# Patient Record
Sex: FEMALE | Race: ASIAN | Employment: OTHER | URBAN - METROPOLITAN AREA
[De-identification: names, ages, dates, MRNs, and addresses within clinical notes are randomized per-mention and may not be internally consistent; named-entity substitution may affect disease eponyms.]

---

## 2018-05-08 ENCOUNTER — HOSPITAL ENCOUNTER (EMERGENCY)
Facility: CLINIC | Age: 68
Discharge: HOME | End: 2018-05-08
Attending: EMERGENCY MEDICINE | Admitting: EMERGENCY MEDICINE
Payer: MEDICARE

## 2018-05-08 VITALS
DIASTOLIC BLOOD PRESSURE: 60 MMHG | HEIGHT: 60 IN | OXYGEN SATURATION: 97 % | WEIGHT: 118 LBS | HEART RATE: 45 BPM | TEMPERATURE: 97.4 F | RESPIRATION RATE: 32 BRPM | BODY MASS INDEX: 23.16 KG/M2 | SYSTOLIC BLOOD PRESSURE: 146 MMHG

## 2018-05-08 DIAGNOSIS — N30.01 ACUTE CYSTITIS WITH HEMATURIA: ICD-10-CM

## 2018-05-08 LAB
ALBUMIN SERPL-MCNC: 3.9 G/DL (ref 3.4–5)
ALBUMIN UR-MCNC: NEGATIVE MG/DL
ALP SERPL-CCNC: 114 U/L (ref 40–150)
ALT SERPL W P-5'-P-CCNC: 32 U/L (ref 0–50)
ANION GAP SERPL CALCULATED.3IONS-SCNC: 6 MMOL/L (ref 3–14)
APPEARANCE UR: CLEAR
AST SERPL W P-5'-P-CCNC: 22 U/L (ref 0–45)
BACTERIA #/AREA URNS HPF: ABNORMAL /HPF
BASOPHILS # BLD AUTO: 0 10E9/L (ref 0–0.2)
BASOPHILS NFR BLD AUTO: 0.1 %
BILIRUB SERPL-MCNC: 0.8 MG/DL (ref 0.2–1.3)
BILIRUB UR QL STRIP: NEGATIVE
BUN SERPL-MCNC: 12 MG/DL (ref 7–30)
CALCIUM SERPL-MCNC: 9.2 MG/DL (ref 8.5–10.1)
CHLORIDE SERPL-SCNC: 104 MMOL/L (ref 94–109)
CO2 SERPL-SCNC: 29 MMOL/L (ref 20–32)
COLOR UR AUTO: YELLOW
CREAT SERPL-MCNC: 0.61 MG/DL (ref 0.52–1.04)
DIFFERENTIAL METHOD BLD: ABNORMAL
EOSINOPHIL # BLD AUTO: 0.2 10E9/L (ref 0–0.7)
EOSINOPHIL NFR BLD AUTO: 1.4 %
ERYTHROCYTE [DISTWIDTH] IN BLOOD BY AUTOMATED COUNT: 12.5 % (ref 10–15)
GFR SERPL CREATININE-BSD FRML MDRD: >90 ML/MIN/1.7M2
GLUCOSE SERPL-MCNC: 114 MG/DL (ref 70–99)
GLUCOSE UR STRIP-MCNC: NEGATIVE MG/DL
HCT VFR BLD AUTO: 42.2 % (ref 35–47)
HGB BLD-MCNC: 14 G/DL (ref 11.7–15.7)
HGB UR QL STRIP: NEGATIVE
IMM GRANULOCYTES # BLD: 0 10E9/L (ref 0–0.4)
IMM GRANULOCYTES NFR BLD: 0.2 %
INTERPRETATION ECG - MUSE: NORMAL
KETONES UR STRIP-MCNC: NEGATIVE MG/DL
LACTATE BLD-SCNC: 1 MMOL/L (ref 0.7–2)
LEUKOCYTE ESTERASE UR QL STRIP: ABNORMAL
LIPASE SERPL-CCNC: 158 U/L (ref 73–393)
LYMPHOCYTES # BLD AUTO: 2.5 10E9/L (ref 0.8–5.3)
LYMPHOCYTES NFR BLD AUTO: 22.3 %
MCH RBC QN AUTO: 31.5 PG (ref 26.5–33)
MCHC RBC AUTO-ENTMCNC: 33.2 G/DL (ref 31.5–36.5)
MCV RBC AUTO: 95 FL (ref 78–100)
MONOCYTES # BLD AUTO: 0.8 10E9/L (ref 0–1.3)
MONOCYTES NFR BLD AUTO: 6.9 %
MUCOUS THREADS #/AREA URNS LPF: PRESENT /LPF
NEUTROPHILS # BLD AUTO: 7.7 10E9/L (ref 1.6–8.3)
NEUTROPHILS NFR BLD AUTO: 69.1 %
NITRATE UR QL: NEGATIVE
NRBC # BLD AUTO: 0 10*3/UL
NRBC BLD AUTO-RTO: 0 /100
PH UR STRIP: 6.5 PH (ref 5–7)
PLATELET # BLD AUTO: 246 10E9/L (ref 150–450)
POTASSIUM SERPL-SCNC: 3.8 MMOL/L (ref 3.4–5.3)
PROT SERPL-MCNC: 8.1 G/DL (ref 6.8–8.8)
RBC # BLD AUTO: 4.45 10E12/L (ref 3.8–5.2)
RBC #/AREA URNS AUTO: 4 /HPF (ref 0–2)
SODIUM SERPL-SCNC: 139 MMOL/L (ref 133–144)
SOURCE: ABNORMAL
SP GR UR STRIP: 1.01 (ref 1–1.03)
SQUAMOUS #/AREA URNS AUTO: 3 /HPF (ref 0–1)
TRANS CELLS #/AREA URNS HPF: <1 /HPF (ref 0–1)
UROBILINOGEN UR STRIP-MCNC: NORMAL MG/DL (ref 0–2)
WBC # BLD AUTO: 11.1 10E9/L (ref 4–11)
WBC #/AREA URNS AUTO: 13 /HPF (ref 0–5)

## 2018-05-08 PROCEDURE — 25000125 ZZHC RX 250: Performed by: EMERGENCY MEDICINE

## 2018-05-08 PROCEDURE — 76705 ECHO EXAM OF ABDOMEN: CPT | Performed by: EMERGENCY MEDICINE

## 2018-05-08 PROCEDURE — 99285 EMERGENCY DEPT VISIT HI MDM: CPT | Mod: 25 | Performed by: EMERGENCY MEDICINE

## 2018-05-08 PROCEDURE — A9270 NON-COVERED ITEM OR SERVICE: HCPCS | Mod: GY | Performed by: EMERGENCY MEDICINE

## 2018-05-08 PROCEDURE — 80053 COMPREHEN METABOLIC PANEL: CPT | Performed by: EMERGENCY MEDICINE

## 2018-05-08 PROCEDURE — 81001 URINALYSIS AUTO W/SCOPE: CPT | Performed by: EMERGENCY MEDICINE

## 2018-05-08 PROCEDURE — 93010 ELECTROCARDIOGRAM REPORT: CPT | Mod: Z6 | Performed by: EMERGENCY MEDICINE

## 2018-05-08 PROCEDURE — 85025 COMPLETE CBC W/AUTO DIFF WBC: CPT | Performed by: EMERGENCY MEDICINE

## 2018-05-08 PROCEDURE — 83690 ASSAY OF LIPASE: CPT | Performed by: EMERGENCY MEDICINE

## 2018-05-08 PROCEDURE — 93005 ELECTROCARDIOGRAM TRACING: CPT | Performed by: EMERGENCY MEDICINE

## 2018-05-08 PROCEDURE — 25000132 ZZH RX MED GY IP 250 OP 250 PS 637: Mod: GY | Performed by: EMERGENCY MEDICINE

## 2018-05-08 PROCEDURE — 83605 ASSAY OF LACTIC ACID: CPT | Performed by: EMERGENCY MEDICINE

## 2018-05-08 PROCEDURE — 76705 ECHO EXAM OF ABDOMEN: CPT | Mod: 26 | Performed by: EMERGENCY MEDICINE

## 2018-05-08 PROCEDURE — 87086 URINE CULTURE/COLONY COUNT: CPT | Performed by: EMERGENCY MEDICINE

## 2018-05-08 RX ORDER — CEPHALEXIN 500 MG/1
500 CAPSULE ORAL 2 TIMES DAILY
Qty: 14 CAPSULE | Refills: 0 | Status: SHIPPED | OUTPATIENT
Start: 2018-05-08 | End: 2018-05-15

## 2018-05-08 RX ADMIN — LIDOCAINE HYDROCHLORIDE 30 ML: 20 SOLUTION ORAL; TOPICAL at 10:24

## 2018-05-08 ASSESSMENT — ENCOUNTER SYMPTOMS
ABDOMINAL PAIN: 1
VOMITING: 0
DIARRHEA: 0
NAUSEA: 0
CONSTIPATION: 0
FREQUENCY: 0
CHILLS: 0
ANAL BLEEDING: 0
BACK PAIN: 1
FEVER: 0
DYSURIA: 0
DIFFICULTY URINATING: 0
SHORTNESS OF BREATH: 0
BLOOD IN STOOL: 0
COUGH: 0

## 2018-05-08 NOTE — DISCHARGE INSTRUCTIONS
"   * BLADDER INFECTION,Female (Adult)    A bladder infection (\"cystitis\" or \"UTI\") usually causes a constant urge to urinate and a burning when passing urine. Urine may be cloudy, smelly or dark. There may be pain in the lower abdomen. A bladder infection occurs when bacteria from the vaginal area enter the bladder opening (urethra). This can occur from sexual intercourse, wearing tight clothing, dehydration and other factors.  HOME CARE:  1. Drink lots of fluids (at least 6-8 glasses a day, unless you must restrict fluids for other medical reasons). This will force the medicine into your urinary system and flush the bacteria out of your body. Cranberry juice has been shown to help clear out the bacteria.  2. Avoid sexual intercourse until your symptoms are gone.  3. A bladder infection is treated with antibiotics. You may also be given Pyridium (generic = phenazopyridine) to reduce the burning sensation. This medicine will cause your urine to become a bright orange color. The orange urine may stain clothing. You may wear a pad or panty-liner to protect clothing.  PREVENTING FUTURE INFECTIONS:  1. Always wipe from front to back after a bowel movement.  2. Keep the genital area clean and dry.  3. Drink plenty of fluids each day to avoid dehydration.  4. Urinate right after intercourse to flush out the bladder.  5. Wear cotton underwear and cotton-lined panty hose; avoid tight-fitting pants.  6. If you are on birth control pills and are having frequent bladder infections, discuss with your doctor.  FOLLOW UP: Return to this facility or see your doctor if ALL symptoms are not gone after three days of treatment.  GET PROMPT MEDICAL ATTENTION if any of the following occur:    Fever over 101 F (38.3 C)    No improvement by the third day of treatment    Increasing back or abdominal pain    Repeated vomiting; unable to keep medicine down    Weakness, dizziness or fainting    Vaginal discharge    Pain, redness or swelling in " the labia (outer vaginal area)    5067-7407 [employer], [employer]. [address] [place] [address], [place] [address]. All rights reserved. This information is not intended as a substitute for professional medical care. Always follow your healthcare professional's instructions.  This information has been modified by your health care provider with permission from the publisher.    Please follow up with your Cardiologist at [facility] for slow heart rate.

## 2018-05-08 NOTE — ED TRIAGE NOTES
67 year old female presents with complaints of acute onset of intermittent crampy, abdominal pain.  Patient denies fever, chills, vomiting, nausea, or diarrhea. Patient also states that during a recent trip to [place], she became dizzy and light headed and was given a medication for her blood pressure.  Triage interview was conducted using a professional .

## 2018-05-08 NOTE — ED AVS SNAPSHOT
" [facility], Emergency Department    [address] [facility] [address]    Phone:  [phone]                                       Dino Hernandez   MRN: [id number]    Department:  [facility], Emergency Department   Date of Visit:  5/8/2018           Patient Information     Date Of Birth          1950        Your diagnoses for this visit were:     Acute cystitis with hematuria        You were seen by Daniela Torres MD.        Discharge Instructions          * BLADDER INFECTION,Female (Adult)    A bladder infection (\"cystitis\" or \"UTI\") usually causes a constant urge to urinate and a burning when passing urine. Urine may be cloudy, smelly or dark. There may be pain in the lower abdomen. A bladder infection occurs when bacteria from the vaginal area enter the bladder opening (urethra). This can occur from sexual intercourse, wearing tight clothing, dehydration and other factors.  HOME CARE:  1. Drink lots of fluids (at least 6-8 glasses a day, unless you must restrict fluids for other medical reasons). This will force the medicine into your urinary system and flush the bacteria out of your body. Cranberry juice has been shown to help clear out the bacteria.  2. Avoid sexual intercourse until your symptoms are gone.  3. A bladder infection is treated with antibiotics. You may also be given Pyridium (generic = phenazopyridine) to reduce the burning sensation. This medicine will cause your urine to become a bright orange color. The orange urine may stain clothing. You may wear a pad or panty-liner to protect clothing.  PREVENTING FUTURE INFECTIONS:  1. Always wipe from front to back after a bowel movement.  2. Keep the genital area clean and dry.  3. Drink plenty of fluids each day to avoid dehydration.  4. Urinate right after intercourse to flush out the bladder.  5. Wear cotton underwear and cotton-lined panty hose; avoid tight-fitting pants.  6. If you are on birth control pills and are " having frequent bladder infections, discuss with your doctor.  FOLLOW UP: Return to this facility or see your doctor if ALL symptoms are not gone after three days of treatment.  GET PROMPT MEDICAL ATTENTION if any of the following occur:    Fever over 101 F (38.3 C)    No improvement by the third day of treatment    Increasing back or abdominal pain    Repeated vomiting; unable to keep medicine down    Weakness, dizziness or fainting    Vaginal discharge    Pain, redness or swelling in the labia (outer vaginal area)    3141-7124 [employer]. [address] [place] [address], [place] [address]. All rights reserved. This information is not intended as a substitute for professional medical care. Always follow your healthcare professional's instructions.  This information has been modified by your health care provider with permission from the publisher.    Please follow up with your Cardiologist at [facility] for slow heart rate.       24 Hour Appointment Hotline       To make an appointment at any [facility], call [phone] ([phone]). If you don't have a family doctor or clinic, we will help you find one. [facility] clinics are conveniently located to serve the needs of you and your family.             Review of your medicines      START taking        Dose / Directions Last dose taken    cephALEXin 500 MG capsule   Commonly known as:  KEFLEX   Dose:  500 mg   Quantity:  14 capsule        Take 1 capsule (500 mg) by mouth 2 times daily for 7 days   Refills:  0          Our records show that you are taking the medicines listed below. If these are incorrect, please call your family doctor or clinic.        Dose / Directions Last dose taken    azithromycin 250 MG tablet   Commonly known as:  ZITHROMAX   Quantity:  6 tablet        Take 1 tablet twice daily as needed for travel diarrhea   Refills:  0        cetirizine 5 MG Chew   Commonly known as:  zyrTEC   Dose:  5 mg        Take 5 mg by mouth daily    Refills:  0        cholecalciferol 5000 units Caps capsule   Commonly known as:  vitamin D3        Take by mouth daily   Refills:  0        OMEPRAZOLE PO   Dose:  20 mg        Take 20 mg by mouth   Refills:  0        zolpidem 5 MG tablet   Commonly known as:  AMBIEN   Dose:  5 mg   Quantity:  14 tablet        Take 1 tablet (5 mg) by mouth nightly as needed for sleep   Refills:  0                Prescriptions were sent or printed at these locations (1 Prescription)                   Other Prescriptions                Printed at Department/Unit printer (1 of 1)         cephALEXin (KEFLEX) 500 MG capsule                Procedures and tests performed during your visit     CBC with platelets differential    Comprehensive metabolic panel    EKG 12 lead    Lactic acid    Lipase    POC US ABDOMEN LIMITED    Peripheral IV catheter    UA with Microscopic reflex to Culture    Urine Culture Aerobic Bacterial      Orders Needing Specimen Collection     None      Pending Results     Date and Time Order Name Status Description    5/8/2018 1001 EKG 12 lead Preliminary     5/8/2018 0947 Urine Culture Aerobic Bacterial In process     5/8/2018 0941 POC US ABDOMEN LIMITED In process             Pending Culture Results     Date and Time Order Name Status Description    5/8/2018 0947 Urine Culture Aerobic Bacterial In process             Pending Results Instructions     If you had any lab results that were not finalized at the time of your Discharge, you can call the ED Lab Result RN at [phone]. You will be contacted by this team for any positive Lab results or changes in treatment. The nurses are available 7 days a week from 10A to 6:30P.  You can leave a message 24 hours per day and they will return your call.        Thank you for choosing [facility]       Thank you for choosing [facility] for your care. Our goal is always to provide you with excellent care. Hearing back from our patients is one way we can continue to improve our  "services. Please take a few minutes to complete the written survey that you may receive in the mail after you visit with us. Thank you!        [employer] Information     [employer] lets you send messages to your doctor, view your test results, renew your prescriptions, schedule appointments and more. To sign up, go to [email or web address] . Click on \"Log in\" on the left side of the screen, which will take you to the Welcome page. Then click on \"Sign up Now\" on the right side of the page.     You will be asked to enter the access code listed below, as well as some personal information. Please follow the directions to create your username and password.     Your access code is: [email or web address]  Expires: 2018 11:42 AM     Your access code will  in 90 days. If you need help or a new code, please call your [facility] clinic or [phone].        Care EveryWhere ID     This is your Care EveryWhere ID. This could be used by other organizations to access your [facility] medical records  [id number]        Equal Access to Services     [facility] Hospital[facility] : Hadelvi Cid, wajadon valle, qadano garcia, lorelei martin. So [facility] [phone].    ATENCIÓN: Si habla español, tiene a carmona disposición servicios gratuitos de asistencia lingüística. Xander al [phone].    We comply with applicable federal civil rights laws and [place] laws. We do not discriminate on the basis of race, color, national origin, age, disability, sex, sexual orientation, or gender identity.            After Visit Summary       This is your record. Keep this with you and show to your community pharmacist(s) and doctor(s) at your next visit.                  "

## 2018-05-08 NOTE — ED AVS SNAPSHOT
[facility], [place], Emergency Department    [address] [place] [address]    Phone:  [phone]                                       Dino Hernandez   MRN: [id number]    Department:  [facility], Emergency Department   Date of Visit:  5/8/2018           After Visit Summary Signature Page     I have received my discharge instructions, and my questions have been answered. I have discussed any challenges I see with this plan with the nurse or doctor.    ..........................................................................................................................................  Patient/Patient Representative Signature      ..........................................................................................................................................  Patient Representative Print Name and Relationship to Patient    ..................................................               ................................................  Date                                            Time    ..........................................................................................................................................  Reviewed by Signature/Title    ...................................................              ..............................................  Date                                                            Time

## 2018-05-08 NOTE — ED PROVIDER NOTES
"  History     Chief Complaint   Patient presents with     Abdominal Pain     The history is provided by the patient. The history is limited by a language barrier (German-speaking). A  was used ([employer] ).     Dino Hernandez is a 67 year old female with a history of gastritis, appendectomy (~1 year ago in [place]), and tubal ligation who presents for evaluation of abdominal pain. Patient complains of constant diffuse abdominal pain originating in the epigastric area and spreading throughout her abdomen that began after she passed a bowel movement this morning around 2 AM. Her pain does radiate into her back. She denies diarrhea and states her bowel movements have been normal. No blood in the stool. She denies nausea or vomiting. Her pain did keep her from sleeping this morning. She states she has had similar symptoms \"a long time ago\". Patient presents with her son-in-law who reports the patient has had multiple episodes of similar symptoms in the past and typically with those episodes the patient will have severe pain over a over a 12 hour period and then her pain will begin to resolve and eventually after about 3-5 days completely subside. Her son-in-law reports the patient was seen and evaluated by her primary care physician for these symptoms, and was prescribed an anti-acid medication (omeprazole) once daily before she eats which has been helping her symptoms. The patient has been living in  [place] for the past four months, but states she has still been compliant with this medication while there. Per Care Everywhere, the patient was tested for H. Pylori in November of 2017 which was negative.     I have reviewed the Medications, Allergies, Past Medical and Surgical History, and Social History in the [employer] system.  Past Medical History:   Diagnosis Date     Gastritis        Past Surgical History:   Procedure Laterality Date     ABDOMEN SURGERY  1985    Tubal Ligation "     APPENDECTOMY         Family History   Problem Relation Age of Onset     GASTROINTESTINAL DISEASE Father        Social History   Substance Use Topics     Smoking status: Never Smoker     Smokeless tobacco: Never Used     Alcohol use No       No current facility-administered medications for this encounter.      Current Outpatient Prescriptions   Medication     cephALEXin (KEFLEX) 500 MG capsule     cetirizine (ZYRTEC) 5 MG CHEW     cholecalciferol (VITAMIN D3) 5000 UNITS CAPS capsule     OMEPRAZOLE PO     azithromycin (ZITHROMAX) 250 MG tablet     zolpidem (AMBIEN) 5 MG tablet      No Known Allergies    Review of Systems   Constitutional: Negative for chills and fever.   HENT: Negative.    Respiratory: Negative for cough and shortness of breath.    Cardiovascular: Negative for chest pain.   Gastrointestinal: Positive for abdominal pain (diffuse). Negative for anal bleeding, blood in stool, constipation, diarrhea, nausea and vomiting.   Genitourinary: Negative for difficulty urinating, dysuria and frequency.   Musculoskeletal: Positive for back pain.   All other systems reviewed and are negative.      Physical Exam   BP: 161/67  Pulse: (!) 45  Heart Rate: 41  Temp: 97.4  F (36.3  C)  Resp: 16  Height: 152.4 cm (5')  Weight: 53.5 kg (118 lb)  SpO2: 95 %      Physical Exam   Gen:A&Ox3, no acute distress  HEENT:PERRL, no facial tenderness or wounds, head atraumatic, oropharynx clear, mucous membranes moist, TMs clear bilaterally  Neck:no bony tenderness or step offs, no JVD, trachea midline  Back: no CVA tenderness, no midline bony tenderness  CV:bradycardia, regular rhythm without murmurs  PULM:Clear to auscultation bilaterally  Abd:soft, non-distended, no focal tenderness to palpation, bowel sounds present. No McBurney's point tenderness, negative Levy's sign.   UE:No traumatic injuries, skin normal  LE:no traumatic injuries, skin normal, no LE edema.   Neuro:CN II-XII intact, strength 5/5 throughout, gait stable.    Skin: no rashes or ecchymoses    ED Course     ED Course     Procedures    9:21 AM  The patient was seen and examined by Dr. Torres in Room 8.          EKG Interpretation:      Interpreted by Daniela Torres  Time reviewed: 10:22 AM  Symptoms at time of EKG: bradycardia, abdominal pain   Rhythm: sinus bradycardia  Rate: 42  Axis: normal  Ectopy: none  Conduction: normal  ST Segments/ T Waves: No ST-T wave changes  Q Waves: none  Comparison to prior: No old EKG available    Clinical Impression: sinus bradycardia.       Critical Care time:  none    Labs Ordered and Resulted from Time of ED Arrival Up to the Time of Departure from the ED   CBC WITH PLATELETS DIFFERENTIAL - Abnormal; Notable for the following:        Result Value    WBC 11.1 (*)     All other components within normal limits   COMPREHENSIVE METABOLIC PANEL - Abnormal; Notable for the following:     Glucose 114 (*)     All other components within normal limits   ROUTINE UA WITH MICROSCOPIC REFLEX TO CULTURE - Abnormal; Notable for the following:     Leukocyte Esterase Urine Large (*)     WBC Urine 13 (*)     RBC Urine 4 (*)     Bacteria Urine Few (*)     Squamous Epithelial /HPF Urine 3 (*)     Mucous Urine Present (*)     All other components within normal limits   LIPASE   LACTIC ACID WHOLE BLOOD   PERIPHERAL IV CATHETER            Assessments & Plan (with Medical Decision Making)   66 yo F presenting with recurrent abdominal pain. Hx of epigastric pain treated with PPI in the past. H.pylori negative in 2017. I reviewed normal outpatient CT abdomen and US done through [facility] in the last few years.  Hx of Hep C with mild liver scarring as well. No previous aortic pathology noted.    Vitals stable.  Abdominal exam without peritonitis, focal tenderness or Levy's sign.   IV access obtained and lab testing done.   CBC with WBC of 11.1  CMP unremarkable.   Lipase negative.   UA concerning for UTI.   EKG without ischemic changes.   Started  on course of keflex for UTI. Urine culture in process.   Follow up with primary care if not improving.     I have reviewed the nursing notes.    I have reviewed the findings, diagnosis, plan and need for follow up with the patient.    Discharge Medication List as of 5/8/2018 11:42 AM      START taking these medications    Details   cephALEXin (KEFLEX) 500 MG capsule Take 1 capsule (500 mg) by mouth 2 times daily for 7 days, Disp-14 capsule, R-0, Local Print             Final diagnoses:   Acute cystitis with hematuria   INaty, am serving as a trained medical scribe to document services personally performed by Daniela Torres MD, based on the provider's statements to me.   IDaniela MD, was physically present and have reviewed and verified the accuracy of this note documented by Naty Curtis.      5/8/2018   [facility], [place], EMERGENCY DEPARTMENT    MD KEILY Nielson Katrina Anne, MD  05/13/18 0207

## 2018-05-09 LAB
BACTERIA SPEC CULT: NORMAL
BACTERIA SPEC CULT: NORMAL
Lab: NORMAL
SPECIMEN SOURCE: NORMAL

## 2019-12-29 ENCOUNTER — HOSPITAL ENCOUNTER (EMERGENCY)
Facility: CLINIC | Age: 69
Discharge: HOME | End: 2019-12-29
Attending: EMERGENCY MEDICINE | Admitting: EMERGENCY MEDICINE
Payer: MEDICARE

## 2019-12-29 ENCOUNTER — APPOINTMENT (OUTPATIENT)
Dept: GENERAL RADIOLOGY | Facility: CLINIC | Age: 69
End: 2019-12-29
Attending: EMERGENCY MEDICINE
Payer: MEDICARE

## 2019-12-29 ENCOUNTER — APPOINTMENT (OUTPATIENT)
Dept: CT IMAGING | Facility: CLINIC | Age: 69
End: 2019-12-29
Attending: EMERGENCY MEDICINE
Payer: MEDICARE

## 2019-12-29 VITALS
RESPIRATION RATE: 16 BRPM | TEMPERATURE: 98.7 F | DIASTOLIC BLOOD PRESSURE: 70 MMHG | HEART RATE: 54 BPM | WEIGHT: 118.39 LBS | OXYGEN SATURATION: 94 % | BODY MASS INDEX: 23.12 KG/M2 | SYSTOLIC BLOOD PRESSURE: 143 MMHG

## 2019-12-29 DIAGNOSIS — T07.XXXA MULTIPLE CONTUSIONS: ICD-10-CM

## 2019-12-29 DIAGNOSIS — S09.90XA CLOSED HEAD INJURY, INITIAL ENCOUNTER: ICD-10-CM

## 2019-12-29 DIAGNOSIS — S70.02XA HEMATOMA OF LEFT HIP: ICD-10-CM

## 2019-12-29 PROCEDURE — 99284 EMERGENCY DEPT VISIT MOD MDM: CPT | Mod: Z6 | Performed by: EMERGENCY MEDICINE

## 2019-12-29 PROCEDURE — 71046 X-RAY EXAM CHEST 2 VIEWS: CPT

## 2019-12-29 PROCEDURE — 70450 CT HEAD/BRAIN W/O DYE: CPT

## 2019-12-29 PROCEDURE — 73502 X-RAY EXAM HIP UNI 2-3 VIEWS: CPT

## 2019-12-29 PROCEDURE — 99285 EMERGENCY DEPT VISIT HI MDM: CPT | Mod: 25

## 2019-12-29 ASSESSMENT — ENCOUNTER SYMPTOMS
HEADACHES: 1
BACK PAIN: 1

## 2019-12-29 NOTE — ED AVS SNAPSHOT
[facility], [place], Emergency Department  [address] [place] [address]  Phone:  [phone]                                    Dino Hernandez   MRN: [id number]    Department:  [facility], Emergency Department   Date of Visit:  12/29/2019           After Visit Summary Signature Page    I have received my discharge instructions, and my questions have been answered. I have discussed any challenges I see with this plan with the nurse or doctor.    ..........................................................................................................................................  Patient/Patient Representative Signature      ..........................................................................................................................................  Patient Representative Print Name and Relationship to Patient    ..................................................               ................................................  Date                                   Time    ..........................................................................................................................................  Reviewed by Signature/Title    ...................................................              ..............................................  Date                                               Time          22EPIC Rev 08/18

## 2019-12-29 NOTE — DISCHARGE INSTRUCTIONS
Please make an appointment to follow up with Your Primary Care Provider as needed.    Ice to areas of pain.    Tylenol and/or ibuprofen for pain.    Return to the emergency department for vomiting, severe headaches, change in vision, numbness or weakness of arms or legs, vomiting, or any other problems.

## 2019-12-29 NOTE — ED PROVIDER NOTES
[place] EMERGENCY DEPARTMENT ([facility])  12/29/19    History     Chief Complaint   Patient presents with     Fall     The history is provided by the patient, a relative and medical records. A  was used (Daughter interpreted [place]).     Dino Hernandez is a 69 year old female with a past medical history significant for gastritis, DM2, HLD, asthma, s/p appendectomy (2017 in [place]), and tubal ligation who presents here to the Emergency Department due to a mechanical fall. Patient reportedly was heading out to breakfast around 9:45 AM this morning when she walked out of her home and slipped on a patch of ice. Patient reports that she landed on her buttock and hit her head. Denies LOC. Was able to ambulate after and wanted to continue with her plans to go to breakfast. Noted immediate swelling to the left posterior side of her head where she is also complaining of pain. Patient is also complaining of pain to her left back and left hip/thigh.    I have reviewed the Medications, Allergies, Past Medical and Surgical History, and Social History in the [employer] system.    Past Medical History:   Diagnosis Date     Gastritis        Past Surgical History:   Procedure Laterality Date     ABDOMEN SURGERY  1985    Tubal Ligation     APPENDECTOMY         Family History   Problem Relation Age of Onset     Gastrointestinal Disease Father        Social History     Tobacco Use     Smoking status: Never Smoker     Smokeless tobacco: Never Used   Substance Use Topics     Alcohol use: No       No current facility-administered medications for this encounter.      Current Outpatient Medications   Medication     azithromycin (ZITHROMAX) 250 MG tablet     cetirizine (ZYRTEC) 5 MG CHEW     cholecalciferol (VITAMIN D3) 5000 UNITS CAPS capsule     OMEPRAZOLE PO     zolpidem (AMBIEN) 5 MG tablet      No Known Allergies      Review of Systems   Musculoskeletal: Positive for back pain (Left back). Negative for  gait problem.        Positive for left hip/thigh pain   Neurological: Positive for headaches (Left posterior head).   All other systems reviewed and are negative.      Physical Exam   BP: (!) 151/74  Pulse: 54  Temp: 98.7  F (37.1  C)  Resp: 16  Weight: 53.7 kg (118 lb 6.2 oz)  SpO2: 97 %      Physical Exam  Vitals signs and nursing note reviewed.   Constitutional:       General: She is not in acute distress.     Appearance: She is well-developed. She is not ill-appearing, toxic-appearing or diaphoretic.   HENT:      Head: Normocephalic. Contusion present.        Mouth/Throat:      Lips: Pink.      Mouth: Mucous membranes are moist.      Pharynx: Oropharynx is clear. No oropharyngeal exudate.   Eyes:      General: Lids are normal. No scleral icterus.     Extraocular Movements: Extraocular movements intact.      Right eye: No nystagmus.      Left eye: No nystagmus.      Conjunctiva/sclera: Conjunctivae normal.      Pupils: Pupils are equal, round, and reactive to light.   Neck:      Musculoskeletal: Normal range of motion and neck supple. No erythema or neck rigidity.      Thyroid: No thyromegaly.      Vascular: No JVD.      Trachea: No tracheal deviation.   Cardiovascular:      Rate and Rhythm: Normal rate and regular rhythm.      Pulses: Normal pulses.      Heart sounds: Normal heart sounds. No murmur. No friction rub. No gallop.    Pulmonary:      Effort: Pulmonary effort is normal. No respiratory distress.      Breath sounds: Normal breath sounds.   Chest:      Chest wall: No tenderness.   Abdominal:      General: Bowel sounds are normal. There is no distension.      Palpations: Abdomen is soft. There is no mass.      Tenderness: There is no abdominal tenderness. There is no guarding or rebound.   Musculoskeletal: Normal range of motion.      Left hip: She exhibits tenderness. She exhibits no bony tenderness and no deformity.      Cervical back: She exhibits no tenderness.      Thoracic back: She exhibits no  tenderness.      Lumbar back: She exhibits no tenderness.      Right lower leg: No edema.      Left lower leg: No edema.        Legs:    Lymphadenopathy:      Cervical: No cervical adenopathy.   Skin:     General: Skin is warm and dry.      Capillary Refill: Capillary refill takes less than 2 seconds.      Coloration: Skin is not pale.      Findings: No erythema or rash.   Neurological:      Mental Status: She is alert and oriented to person, place, and time.      GCS: GCS eye subscore is 4. GCS verbal subscore is 5. GCS motor subscore is 6.      Cranial Nerves: No cranial nerve deficit.      Sensory: No sensory deficit.      Motor: Motor function is intact.   Psychiatric:         Mood and Affect: Mood and affect normal.         Speech: Speech normal.         Behavior: Behavior normal.         ED Course   10:45 AM  The patient was seen and examined by Thom Marshall MD in Room [facility].        Procedures        Results for orders placed or performed during the hospital encounter of 12/29/19 (from the past 48 hour(s))   CT Head w/o Contrast    Narrative    CT HEAD W/O CONTRAST 12/29/2019 11:42 AM    Provided History: trauma    Comparison: None.    Technique: Using multidetector thin collimation helical acquisition  technique, axial, coronal and sagittal CT images from the skull base  to the vertex were obtained without intravenous contrast.     Findings:    No intracranial hemorrhage, mass effect, or midline shift. The  ventricles are proportionate to the cerebral sulci. The gray to white  matter differentiation of the cerebral hemispheres is preserved. The  basal cisterns are patent.    The visualized paranasal sinuses are clear. The mastoid air cells are  clear. Left parietal scalp hematoma.       Impression    Impression:  1. No acute intracranial pathology.  2. Left parietal scalp hematoma.    I have personally reviewed the examination and initial interpretation  and I agree with the findings.    ESTEVAN MANRIQUE MD    XR Chest 2 Views    Narrative     Examination:  XR CHEST 2 VW     Date:  12/29/2019 12:44 PM      Clinical Information: trauma     Additional Information: none    Comparison: none    Findings:   Pulmonary vasculature is distinct. Aortic knob and upper right  mediastinal margin is prominent. Lungs and pleural spaces are clear.   Degenerative changes in lumbar spine.      Impression    Impression:  1. Slightly widened upper mediastinum. Likely normal but depending on  the trauma may wish to consider CT scan.     AGNIESZKA TENORIO MD   XR Pelvis and Hip Left 2 Views    Narrative    EXAM: XR PELVIS AND HIP LEFT 2 VIEWS  LOCATION: [facility]  DATE/TIME: 12/29/2019, 12:33 PM    INDICATION: Trauma. Hip pain.  COMPARISON: None.      Impression    IMPRESSION: Anatomic alignment left hip. No acute displaced left hip fracture. 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 Transitional lumbosacral segment. Degenerative change at the symphysis pubis. Degenerative   change lower lumbar spine.            Assessments & Plan (with Medical Decision Making)   This patient presented Emergency Department after a ground-level mechanical fall when she slipped on the ice.  She did suffer head injury but did not lose consciousness.  She is not on blood thinners and had a head CT that demonstrated no evidence of a skull fracture, or other intracranial pathology or bleed.  She had no complaints of neck pain and had no tenderness to palpation in the cervical, thoracic, and lumbar spines and I do not feel that imaging of these structures was necessary.  She did complain of some mild left pelvic pain but the x-ray of the pelvis and left hip demonstrate no acute fractures.  She is able to ambulate without significant pain decreasing suspicion for occult hip fracture.  Chest x-ray demonstrated no evidence of a pneumothorax, hemothorax or rib fractures.  Abdominal exam is benign, I did not feel  abdominal injury imaging was necessary.  At this point time I feel comfortable discharging her and instructed her to follow-up with her primary clinic for any problems and to return for any worsening.    This part of the medical record was transcribed by David López, Medical Scribe, from a dictation done by Thom Marshall MD.     I have reviewed the nursing notes.    I have reviewed the findings, diagnosis, plan and need for follow up with the patient.    Discharge Medication List as of 12/29/2019  2:59 PM          Final diagnoses:   Multiple contusions   Closed head injury, initial encounter     I, David López, am serving as a trained medical scribe to document services personally performed by Thom Marshall MD, based on the provider's statements to me.   I, Thom Marshall MD, was physically present and have reviewed and verified the accuracy of this note documented by David López.    12/29/2019   [facility], [place], EMERGENCY DEPARTMENT     Thom Marshall MD  12/31/19 [id number]

## 2019-12-29 NOTE — ED TRIAGE NOTES
Pt presents ambulatory to triage from home. Pt's daughter interpreting for Pt at this time, signed waiver. Pt states slipped and fell on ice injuring left side posterior head, left sided back pain. Pt states has chronic left chest pain. Pt also c/o pain left side of hip that radiates down leg.